# Patient Record
Sex: FEMALE
[De-identification: names, ages, dates, MRNs, and addresses within clinical notes are randomized per-mention and may not be internally consistent; named-entity substitution may affect disease eponyms.]

---

## 2018-04-16 ENCOUNTER — HOSPITAL ENCOUNTER (EMERGENCY)
Dept: HOSPITAL 31 - C.ER | Age: 23
Discharge: HOME | End: 2018-04-16
Payer: COMMERCIAL

## 2018-04-16 VITALS
TEMPERATURE: 98.6 F | SYSTOLIC BLOOD PRESSURE: 102 MMHG | RESPIRATION RATE: 20 BRPM | HEART RATE: 82 BPM | DIASTOLIC BLOOD PRESSURE: 67 MMHG

## 2018-04-16 VITALS — BODY MASS INDEX: 26.5 KG/M2

## 2018-04-16 VITALS — OXYGEN SATURATION: 99 %

## 2018-04-16 DIAGNOSIS — N83.201: Primary | ICD-10-CM

## 2018-04-16 DIAGNOSIS — R10.32: ICD-10-CM

## 2018-04-16 LAB
ALBUMIN SERPL-MCNC: 4.1 G/DL (ref 3.5–5)
ALBUMIN/GLOB SERPL: 1.1 {RATIO} (ref 1–2.1)
ALT SERPL-CCNC: 27 U/L (ref 9–52)
AST SERPL-CCNC: 28 U/L (ref 14–36)
BACTERIA #/AREA URNS HPF: (no result) /[HPF]
BASOPHILS # BLD AUTO: 0 K/UL (ref 0–0.2)
BASOPHILS NFR BLD: 0.6 % (ref 0–2)
BILIRUB UR-MCNC: NEGATIVE MG/DL
BUN SERPL-MCNC: 12 MG/DL (ref 7–17)
CALCIUM SERPL-MCNC: 9.2 MG/DL (ref 8.6–10.4)
EOSINOPHIL # BLD AUTO: 0.1 K/UL (ref 0–0.7)
EOSINOPHIL NFR BLD: 1.3 % (ref 0–4)
ERYTHROCYTE [DISTWIDTH] IN BLOOD BY AUTOMATED COUNT: 12.1 % (ref 11.5–14.5)
GFR NON-AFRICAN AMERICAN: > 60
GLUCOSE UR STRIP-MCNC: NORMAL MG/DL
HCG,QUALITATIVE URINE: NEGATIVE
HGB BLD-MCNC: 14.7 G/DL (ref 11–16)
LEUKOCYTE ESTERASE UR-ACNC: (no result) LEU/UL
LIPASE: 176 U/L (ref 23–300)
LYMPHOCYTES # BLD AUTO: 0.8 K/UL (ref 1–4.3)
LYMPHOCYTES NFR BLD AUTO: 11.9 % (ref 20–40)
MCH RBC QN AUTO: 30.7 PG (ref 27–31)
MCHC RBC AUTO-ENTMCNC: 34.7 G/DL (ref 33–37)
MCV RBC AUTO: 88.5 FL (ref 81–99)
MONOCYTES # BLD: 0.5 K/UL (ref 0–0.8)
MONOCYTES NFR BLD: 7.6 % (ref 0–10)
NEUTROPHILS # BLD: 5.5 K/UL (ref 1.8–7)
NEUTROPHILS NFR BLD AUTO: 78.6 % (ref 50–75)
NRBC BLD AUTO-RTO: 0 % (ref 0–2)
PH UR STRIP: 6 [PH] (ref 5–8)
PLATELET # BLD: 313 K/UL (ref 130–400)
PMV BLD AUTO: 7.9 FL (ref 7.2–11.7)
PROT UR STRIP-MCNC: NEGATIVE MG/DL
RBC # BLD AUTO: 4.8 MIL/UL (ref 3.8–5.2)
RBC # UR STRIP: NEGATIVE /UL
SP GR UR STRIP: 1.03 (ref 1–1.03)
SQUAMOUS EPITHIAL: 5 /HPF (ref 0–5)
UROBILINOGEN UR-MCNC: NORMAL MG/DL (ref 0.2–1)
WBC # BLD AUTO: 7 K/UL (ref 4.8–10.8)

## 2018-04-16 PROCEDURE — 76830 TRANSVAGINAL US NON-OB: CPT

## 2018-04-16 PROCEDURE — 84702 CHORIONIC GONADOTROPIN TEST: CPT

## 2018-04-16 PROCEDURE — 99285 EMERGENCY DEPT VISIT HI MDM: CPT

## 2018-04-16 PROCEDURE — 76856 US EXAM PELVIC COMPLETE: CPT

## 2018-04-16 PROCEDURE — 83690 ASSAY OF LIPASE: CPT

## 2018-04-16 PROCEDURE — 96374 THER/PROPH/DIAG INJ IV PUSH: CPT

## 2018-04-16 PROCEDURE — 85025 COMPLETE CBC W/AUTO DIFF WBC: CPT

## 2018-04-16 PROCEDURE — 81001 URINALYSIS AUTO W/SCOPE: CPT

## 2018-04-16 PROCEDURE — 84703 CHORIONIC GONADOTROPIN ASSAY: CPT

## 2018-04-16 PROCEDURE — 80053 COMPREHEN METABOLIC PANEL: CPT

## 2018-04-16 NOTE — C.PDOC
Time Seen by Provider: 04/16/18 16:00


Chief Complaint (Nursing): Abdominal Pain


History Per: Patient


Onset/Duration Of Symptoms: Days (3), Intermittent Episodes


Current Symptoms Are (Timing): Still Present


Severity: Moderate


Location Of Pain/Discomfort: LLQ


Quality Of Discomfort: "Pain"


Alleviating Factors: None


Additional History Per: Prior Records


Abnormal Vaginal Bleeding: No





Past Medical History


Reviewed: Historical Data, Nursing Documentation, Vital Signs


Vital Signs: 


 Last Vital Signs











Temp  98.6 F   04/16/18 21:32


 


Pulse  82   04/16/18 21:32


 


Resp  20   04/16/18 21:32


 


BP  102/67   04/16/18 21:32


 


Pulse Ox  98   04/16/18 21:32














- Medical History


PMH: Asthma


Other PMH: Ovarian cysts


Surgical History: No Surg Hx


Family History: States: Unknown Family Hx





- Social History


Hx Tobacco Use: No


Hx Alcohol Use: Yes


Hx Substance Use: No





- Immunization History


Hx Tetanus Toxoid Vaccination: Yes


Hx Influenza Vaccination: Yes (2017)


Hx Pneumococcal Vaccination: No





Review Of Systems


Except As Marked, All Systems Reviewed And Found Negative.


Constitutional: Negative for: Fever, Weakness


Cardiovascular: Negative for: Chest Pain


Respiratory: Negative for: Shortness of Breath


Gastrointestinal: Negative for: Vomiting, Diarrhea


Genitourinary: Negative for: Dysuria, Hematuria, Vaginal Discharge, Vaginal 

Bleeding


Musculoskeletal: Negative for: Neck Pain


Skin: Negative for: Rash


Neurological: Negative for: Weakness, Numbness





Physical Exam





- Physical Exam


Appears: Non-toxic, No Acute Distress


Skin: Normal Color, Warm, Dry, No Rash


Head: Atraumatic, Normacephalic


Eye(s): bilateral: Normal Inspection, PERRL, EOMI


Neck: Normal ROM, Supple


Cardiovascular: Rhythm Regular


Respiratory: Normal Breath Sounds, No Accessory Muscle Use


Gastrointestinal/Abdominal: Soft, Tenderness (LLQ)


Back: No CVA Tenderness


Extremity: Normal ROM


Neurological/Psych: Oriented x3, Normal Motor, Normal Sensation





ED Course And Treatment





- Laboratory Results


Result Diagrams: 


 04/16/18 16:24





 04/16/18 16:24


Lab Interpretation: No Acute Changes


Urine Pregnancy POC: Negative


O2 Sat by Pulse Oximetry: 99


Pulse Ox Interpretation: Normal





- CT Scan/US


  ** Pelvic US


Other Rad Studies (CT/US): Read By Radiologist, Radiology Report Reviewed


CT/US Interpretation: IMPRESSION:  Large complex appearing right ovarian cyst. 

Small amount of for free fluid is present adjacent to the right ovary. 

Recommend follow-up ultrasound during the next menstrual cycle shortly 

following cessation of menses to assess for resolution of this large complex 

cyst.





- Physician Consult Information


Physician Contacted: Nico Marcus (Gyn)


Outcome Of Conversation: She examined pt in the ED. She states pt can be 

discharged home and f/up with her Gynecologist as outpatient.





Disposition


Counseled Patient/Family Regarding: Studies Performed, Diagnosis, Need For 

Followup, Rx Given





- Disposition


Disposition: HOME/ ROUTINE


Disposition Time: 21:49


Condition: STABLE


Additional Instructions: 


Follow up with your Gynecologist within 1 week for further evaluation and 

treatment. Return to the ER if you develop severe pain, vomiting, fever, 

worsening of symptoms or if you have any other concerns. 


Prescriptions: 


Naproxen [Naprosyn] 1 tab PO BID PRN #20 tab


 PRN Reason: Pain


Instructions:  Ovarian Cyst (DC)





- Clinical Impression


Clinical Impression: 


 Complex cyst of right ovary, LLQ pain

## 2018-04-16 NOTE — CP.PCM.CON
History of Present Illness





- History of Present Illness


History of Present Illness: 





Pt is 23yo G0 with an LMP 3/2/2018.  She had been on ocps which she also 

stopped  in March and did not resume. She presents today w/ c/o left lower quad 

pain which she rates as 8-9/10 and is aggravated by laughing, walking, or 

change in position. Alleviated by remaining still. Last sexually active last 

night- denies dysparenia. She states on Saturday she had a mild LLQ pain. She 

denies increased physical exertion or any inciting event. Pt states she has a 

history of ovarian cyst which resolve spontaneously with the exception of 1 

incident in which the cyst ruptured and was managed conservatively.


She denies n/v, fever, vag d/c.





PMHX: asthma; receive reg injections for allergies





PSHX: ACL tear 2011; right hand surgery; appendectomy





SHX: occupation:  training


denies tobacco or illicit drug use; etoh occasionally, 1x Q3mo's





NKDA





MEDIC: none














Review of Systems





- Constitutional


Constitutional: absent: Excessive Sweating, Fever, Headache





- Cardiovascular


Cardiovascular: absent: Chest Pain, Chest Pain with Activity, Dyspnea





- Respiratory


Respiratory: absent: Cough, Dyspnea on Exertion, Pain on Inspiration





- Gastrointestinal


Gastrointestinal: absent: Dyspepsia, Dysphagia, Heartburn, Nausea, Vomiting





- Genitourinary


Genitourinary: absent: Dysuria





- Reproductive: Female


Reproductive:Female: absent: Dyspareunia, Genital Lesions, Vaginal Odor





Past Patient History





- Past Social History


Smoking Status: Never Smoked





- PULMONARY


Hx Asthma: Yes





- GENITOURINARY/GYNECOLOGICAL


Hx Genitourinary Disorders: Yes (SEE COMMENT)


Other/Comment: OVARIAN CYST BILATERALLY





- PSYCHIATRIC


Hx Substance Use: No





Meds


Allergies/Adverse Reactions: 


 Allergies











Allergy/AdvReac Type Severity Reaction Status Date / Time


 


No Known Allergies Allergy   Verified 04/16/18 15:27














Physical Exam





- Constitutional


Appears: Well, Non-toxic, No Acute Distress





- Head Exam


Head Exam: ATRAUMATIC, NORMOCEPHALIC





- Respiratory Exam


Respiratory Exam: NORMAL BREATHING PATTERN.  absent: Accessory Muscle Use





- GI/Abdominal Exam


GI & Abdominal Exam: Soft.  absent: Diminished Bowel Sounds, Distended, Firm, 

Guarding, Tenderness





Results





- Vital Signs


Recent Vital Signs: 


 Last Vital Signs











Temp  98.2 F   04/16/18 15:30


 


Pulse  96 H  04/16/18 15:30


 


Resp  17   04/16/18 15:30


 


BP  107/74   04/16/18 15:30


 


Pulse Ox  99   04/16/18 20:21














- Labs


Result Diagrams: 


 04/16/18 16:24





 04/16/18 16:24


Labs: 


 Laboratory Results - last 24 hr











  04/16/18 04/16/18 04/16/18





  16:18 16:24 16:24


 


WBC   7.0 


 


RBC   4.80 


 


Hgb   14.7 


 


Hct   42.5 


 


MCV   88.5 


 


MCH   30.7 


 


MCHC   34.7 


 


RDW   12.1 


 


Plt Count   313 


 


MPV   7.9 


 


Neut % (Auto)   78.6 H 


 


Lymph % (Auto)   11.9 L 


 


Mono % (Auto)   7.6 


 


Eos % (Auto)   1.3 


 


Baso % (Auto)   0.6 


 


Neut # (Auto)   5.5 


 


Lymph # (Auto)   0.8 L 


 


Mono # (Auto)   0.5 


 


Eos # (Auto)   0.1 


 


Baso # (Auto)   0.0 


 


Sodium    139


 


Potassium    4.4


 


Chloride    103


 


Carbon Dioxide    27


 


Anion Gap    14


 


BUN    12


 


Creatinine    0.8


 


Est GFR ( Amer)    > 60


 


Est GFR (Non-Af Amer)    > 60


 


Random Glucose    91


 


Calcium    9.2


 


Total Bilirubin    0.4


 


AST    28


 


ALT    27


 


Alkaline Phosphatase    78


 


Total Protein    7.8


 


Albumin    4.1


 


Globulin    3.6


 


Albumin/Globulin Ratio    1.1


 


Lipase    176


 


Urine Color  Yellow  


 


Urine Clarity  Hazy  


 


Urine pH  6.0  


 


Ur Specific Gravity  1.028  


 


Urine Protein  Negative  


 


Urine Glucose (UA)  Normal  


 


Urine Ketones  Negative  


 


Urine Blood  Negative  


 


Urine Nitrate  Negative  


 


Urine Bilirubin  Negative  


 


Urine Urobilinogen  Normal  


 


Ur Leukocyte Esterase  Neg  


 


Urine WBC (Auto)  1  


 


Urine RBC (Auto)  3  


 


Ur Squamous Epith Cells  5  


 


Urine Bacteria  Rare  


 


Urine HCG, Qual  Negative

## 2018-04-16 NOTE — US
PROCEDURE:  Pelvic ultrasound dated 04/16/2018



HISTORY:

LLQ pain. h/o ovarian cysts



COMPARISON:

No prior study available for comparison.



TECHNIQUE:

Transabdominal -transvaginal sonographic evaluation of the pelvis 

performed.



FINDINGS:

The uterus is anteverted measuring approximately 8.0 x 3.6 x 4.4 cm. 

No uterine masses.  Endometrial stripe measures 9.8 mm.



Right ovary measures approximately 6.1 x4 0.6 x 5.9 cm. There is a 

large complex appearing cystic focus that measures 7.4 x 3.8 x 5.5 

cm. Recommend follow-up pelvic ultrasound during the next menstrual 

cycle shortly following cessation of menses to assess for resolution. 

There is a small amount of free fluid adjacent to the right ovary. 

Right ovary exhibits arterial flow. . 



Left ovary measures approximately 2.8 x 1.6 x 2.5 cm. Left ovary 

exhibits arterial flow 



IMPRESSION:

Large complex appearing right ovarian cyst. Small amount of for free 

fluid is present adjacent to the right ovary. Recommend follow-up 

ultrasound during the next menstrual cycle shortly following 

cessation of menses to assess for resolution of this large complex 

cyst.